# Patient Record
Sex: MALE | Race: WHITE
[De-identification: names, ages, dates, MRNs, and addresses within clinical notes are randomized per-mention and may not be internally consistent; named-entity substitution may affect disease eponyms.]

---

## 2022-02-08 ENCOUNTER — HOSPITAL ENCOUNTER (OUTPATIENT)
Dept: HOSPITAL 47 - ORWHC2ENDO | Age: 47
Discharge: HOME | End: 2022-02-08
Attending: SURGERY
Payer: COMMERCIAL

## 2022-02-08 VITALS — HEART RATE: 73 BPM | DIASTOLIC BLOOD PRESSURE: 84 MMHG | SYSTOLIC BLOOD PRESSURE: 124 MMHG

## 2022-02-08 VITALS — RESPIRATION RATE: 16 BRPM

## 2022-02-08 VITALS — BODY MASS INDEX: 32.3 KG/M2

## 2022-02-08 DIAGNOSIS — K62.1: ICD-10-CM

## 2022-02-08 DIAGNOSIS — Z88.0: ICD-10-CM

## 2022-02-08 DIAGNOSIS — D12.3: Primary | ICD-10-CM

## 2022-02-08 DIAGNOSIS — K62.5: ICD-10-CM

## 2022-02-08 DIAGNOSIS — Z88.5: ICD-10-CM

## 2022-02-08 DIAGNOSIS — Z80.9: ICD-10-CM

## 2022-02-08 DIAGNOSIS — Z98.890: ICD-10-CM

## 2022-02-08 DIAGNOSIS — Z87.891: ICD-10-CM

## 2022-02-08 DIAGNOSIS — K63.5: ICD-10-CM

## 2022-02-08 PROCEDURE — 88305 TISSUE EXAM BY PATHOLOGIST: CPT

## 2022-02-08 PROCEDURE — 45385 COLONOSCOPY W/LESION REMOVAL: CPT

## 2022-02-08 NOTE — P.PCN
Date of Procedure: 02/08/22


Procedure(s) Performed: 


PREOPERATIVE DIAGNOSIS: Rectal bleeding


POSTOPERATIVE DIAGNOSIS: Multiple colonic polyps


PROCEDURE: Colonoscopy with snare polypectomy


ANESTHESIA: MAC


SURGEON: Toni Dunham M.D.


SPECIMENS: Polyps


ENDOSCOPIC PROCEDURE:  The patient was placed on the endoscopy table in the left

decubitus position.  The Olympus colonoscope was inserted into the anus and 

passed under direct visualization to the base of the cecum.  The appendiceal 

orifice was visualized.  From that point the scope was slowly withdrawn 

inspecting all surfaces carefully.  There were no neoplastic inflammatory or 

polypoid lesions throughout the cecum or ascending colon.  At the hepatic 

flexure a polyp was seen and removed using the snare with cautery technique.  

Another similar polyp was seen in the mid transverse colon and removed in a 

similar fashion.  The descending colon appeared normal.  In the sigmoid there 

were 3 polyps all removed using the snare with cautery technique.  In the rectum

the another small polyp was seen and removed in a similar fashion.  There was no

visible diverticulosis.  Digital rectal examination was normal.  The patient was

taken to the recovery room in stable condition per anesthesia guidelines.


RECOMMENDATIONS: Await biopsy results.  Anticipate repeat colonoscopy 3-5 years.

## 2022-02-08 NOTE — P.GSHP
History of Present Illness


H&P Date: 02/08/22


Chief Complaint: Rectal bleeding





47-year-old male here today for colonoscopy.  Admits to intermittent episodes of

rectal bleeding.  Symptoms for the last 2-3 years.  No bleeding recently.  No 

history of colon cancer in his family.  No change in bowel habits.





Past Medical History


Past Medical History: No Reported History


History of Any Multi-Drug Resistant Organisms: None Reported


Past Surgical History: No Surgical Hx Reported


Additional Past Surgical History / Comment(s): CYST REMOVED FROM BUTTOCKS AREA,


Past Anesthesia/Blood Transfusion Reactions: No Reported Reaction


Smoking Status: Former smoker





- Past Family History


  ** Father


Family Medical History: Cancer





Medications and Allergies


                                Home Medications











 Medication  Instructions  Recorded  Confirmed  Type


 


Multivitamins, Thera [Multivitamin 1 tab PO DAILY 02/03/22 02/08/22 History





(formulary)]    








                                    Allergies











Allergy/AdvReac Type Severity Reaction Status Date / Time


 


codeine AdvReac  Nausea & Verified 02/08/22 10:17





   Vomiting  


 


Penicillins AdvReac  Nausea & Verified 02/08/22 10:17





   Vomiting  














Surgical - Exam


                                   Vital Signs











Pulse Resp BP Pulse Ox


 


 95   18   142/87   98 


 


 02/08/22 10:21  02/08/22 10:21  02/08/22 10:21  02/08/22 10:21

















Physical exam:


General: Well-developed, well-nourished


HEENT: Normocephalic, sclerae nonicteric


Abdomen: Nontender, nondistended


Extremities: No edema


Neuro: Alert and oriented





Assessment and Plan


(1) Rectal bleeding


Narrative/Plan: 


Will proceed with colonoscopy at this time


Current Visit: Yes   Status: Acute   Code(s): K62.5 - HEMORRHAGE OF ANUS AND 

RECTUM   SNOMED Code(s): 10008862

## 2022-09-05 ENCOUNTER — HOSPITAL ENCOUNTER (EMERGENCY)
Dept: HOSPITAL 47 - EC | Age: 47
Discharge: HOME | End: 2022-09-05
Payer: COMMERCIAL

## 2022-09-05 VITALS
RESPIRATION RATE: 18 BRPM | HEART RATE: 77 BPM | SYSTOLIC BLOOD PRESSURE: 145 MMHG | TEMPERATURE: 98.6 F | DIASTOLIC BLOOD PRESSURE: 88 MMHG

## 2022-09-05 DIAGNOSIS — S90.32XA: Primary | ICD-10-CM

## 2022-09-05 DIAGNOSIS — Z87.891: ICD-10-CM

## 2022-09-05 DIAGNOSIS — Z88.5: ICD-10-CM

## 2022-09-05 DIAGNOSIS — Z88.0: ICD-10-CM
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
## 2022-09-05 PROCEDURE — 99283 EMERGENCY DEPT VISIT LOW MDM: CPT

## 2022-09-05 NOTE — XR
EXAMINATION TYPE: XR foot complete LT

 

DATE OF EXAM: 9/5/2022

 

COMPARISON: NONE

 

HISTORY: Foot pain

 

TECHNIQUE: 3 views

 

FINDINGS: Metatarsals appear intact. The toes are intact. No fracture nor dislocation. There is some 
soft tissue swelling on the dorsum of the foot.

There is some spurring of the anterior malleolus.

IMPRESSION: Soft tissue swelling. No fracture seen. Arthritic changes at the ankle joint.

## 2022-09-05 NOTE — ED
General Adult HPI





- General


Chief complaint: Extremity Injury, Lower


Stated complaint: L foot injury


Time Seen by Provider: 09/05/22 17:35


Source: patient, RN notes reviewed, old records reviewed


Mode of arrival: wheelchair


Limitations: no limitations





- History of Present Illness


Initial comments: 





This is a 47-year-old male who presents emergency Department complaining that a 

tank weighing about 160 pounds fell over and hit him in the left foot.  Patient 

denies any ankle pain patient denies any toe pain.  Patient states it swelled up

immediately and it hurts too much to ambulate on.  Patient denies any other 

injury at this time.  Patient denies any previous injury to that foot.





- Related Data


                                Home Medications











 Medication  Instructions  Recorded  Confirmed


 


Multivitamins, Thera [Multivitamin 1 tab PO DAILY 02/03/22 02/08/22





(formulary)]   








                                  Previous Rx's











 Medication  Instructions  Recorded


 


Ibuprofen [Motrin] 800 mg PO Q6H #20 tab 09/05/22











                                    Allergies











Allergy/AdvReac Type Severity Reaction Status Date / Time


 


codeine AdvReac  Nausea & Verified 09/05/22 16:33





   Vomiting  


 


Penicillins AdvReac  Nausea & Verified 09/05/22 16:33





   Vomiting  














Review of Systems


ROS Statement: 


Those systems with pertinent positive or pertinent negative responses have been 

documented in the HPI.





ROS Other: All systems not noted in ROS Statement are negative.





Past Medical History


Past Medical History: No Reported History


History of Any Multi-Drug Resistant Organisms: None Reported


Past Surgical History: No Surgical Hx Reported


Additional Past Surgical History / Comment(s): CYST REMOVED FROM BUTTOCKS AREA,


Past Anesthesia/Blood Transfusion Reactions: No Reported Reaction


Past Psychological History: No Psychological Hx Reported


Smoking Status: Former smoker


Past Alcohol Use History: Occasional


Past Drug Use History: None Reported





- Past Family History


  ** Father


Family Medical History: Cancer





General Exam





- General Exam Comments


Initial Comments: 





GENERAL 


Patient is well-developed and well-nourished.  Patient is in mild distress.





EYES


Patient's pupils are equal and round.  Extraocular motion is intact





SKIN


Unremarkable





NEURO


The patient is alert and oriented 3





PYSCH


Patient has normal interpersonal interactions.





MUSCULOSKELETAL


Patient's left foot is tender at the distal first second third and fourth 

metatarsal.  Patient also has bruising and some swelling in that area.  Patient 

has no ankle tenderness patient has no toe tenderness.


Limitations: no limitations





Course





                                   Vital Signs











  09/05/22





  16:31


 


Temperature 98.6 F


 


Pulse Rate 77


 


Respiratory 18





Rate 


 


Blood Pressure 145/88


 


O2 Sat by Pulse 98





Oximetry 














Medical Decision Making





- Medical Decision Making





X-ray of the foot shows no acute abnormality.





Patient was unable to ambulate on the foot.  Patient states he has crutches at 

home and he will use them to get around





Disposition


Clinical Impression: 


 Contusion, foot





Disposition: HOME SELF-CARE


Condition: Good


Instructions (If sedation given, give patient instructions):  Foot Contusion 

(ED)


Additional Instructions: 


Patient should weight-bear as tolerated.





If the pain is not getting better in 4-5 days patient should follow-up with 

orthopedics


Prescriptions: 


Ibuprofen [Motrin] 800 mg PO Q6H #20 tab


Is patient prescribed a controlled substance at d/c from ED?: No


Referrals: 


Jakub Enamorado MD [Primary Care Provider] - 1-2 days


Time of Disposition: 18:07